# Patient Record
Sex: MALE | Race: WHITE
[De-identification: names, ages, dates, MRNs, and addresses within clinical notes are randomized per-mention and may not be internally consistent; named-entity substitution may affect disease eponyms.]

---
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
## 2020-12-26 NOTE — XR
EXAMINATION TYPE: XR knee complete RT

 

DATE OF EXAM: 12/26/2020

 

COMPARISON: NONE

 

HISTORY: Pain

 

TECHNIQUE: 3 views

 

FINDINGS: I see no fracture nor dislocation. Joint spaces are normal. There are no pathologic calcifi
cations.

 

IMPRESSION: Negative right knee exam.
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
## 2020-12-28 NOTE — XR
EXAMINATION TYPE: XR tibia fibula RT

 

DATE OF EXAM: 12/28/2020

 

COMPARISON: NONE

 

HISTORY: Pain

 

TECHNIQUE: 2 views

 

FINDINGS: Tibia and fibula appear intact. I see no fracture nor dislocation. Joint spaces are normal.


 

IMPRESSION: Negative right tibia and fibula exam.
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
## 2020-12-28 NOTE — XR
EXAMINATION TYPE: XR nasal bone

 

DATE OF EXAM: 12/28/2020

 

COMPARISON: NONE

 

HISTORY: Pain

 

TECHNIQUE: 3 views

 

FINDINGS: Nasal bone is intact. Maxillary spine is intact. Orbital margins appear intact.

 

IMPRESSION: Negative nasal bone exam.
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
